# Patient Record
Sex: MALE | Race: WHITE
[De-identification: names, ages, dates, MRNs, and addresses within clinical notes are randomized per-mention and may not be internally consistent; named-entity substitution may affect disease eponyms.]

---

## 2021-04-23 ENCOUNTER — HOSPITAL ENCOUNTER (EMERGENCY)
Dept: HOSPITAL 54 - ER | Age: 56
Discharge: HOME | End: 2021-04-23
Payer: MEDICAID

## 2021-04-23 VITALS — DIASTOLIC BLOOD PRESSURE: 84 MMHG | SYSTOLIC BLOOD PRESSURE: 136 MMHG

## 2021-04-23 VITALS — WEIGHT: 220 LBS | BODY MASS INDEX: 35.36 KG/M2 | HEIGHT: 66 IN

## 2021-04-23 DIAGNOSIS — Z79.899: ICD-10-CM

## 2021-04-23 DIAGNOSIS — J86.9: Primary | ICD-10-CM

## 2021-04-23 DIAGNOSIS — E66.9: ICD-10-CM

## 2021-04-23 NOTE — NUR
BIB SELF C/O ABSCESS ON THE CHEST. RATES PAIN 5/10. RESPIRATION REGULAR AND 
UNLABORED. DENIES SOB. WILL CONTINUE TO MONITOR THE PATIENT.